# Patient Record
Sex: FEMALE | Race: WHITE | Employment: FULL TIME | URBAN - METROPOLITAN AREA
[De-identification: names, ages, dates, MRNs, and addresses within clinical notes are randomized per-mention and may not be internally consistent; named-entity substitution may affect disease eponyms.]

---

## 2018-02-02 ENCOUNTER — HOSPITAL ENCOUNTER (OUTPATIENT)
Dept: GENERAL RADIOLOGY | Age: 48
Discharge: HOME | End: 2018-02-02
Attending: FAMILY MEDICINE
Payer: MEDICAID

## 2018-02-02 ENCOUNTER — OFFICE VISIT (OUTPATIENT)
Dept: FAMILY MEDICINE CLINIC | Facility: CLINIC | Age: 48
End: 2018-02-02

## 2018-02-02 ENCOUNTER — LAB ENCOUNTER (OUTPATIENT)
Dept: LAB | Age: 48
End: 2018-02-02
Attending: FAMILY MEDICINE
Payer: MEDICAID

## 2018-02-02 VITALS
HEART RATE: 65 BPM | WEIGHT: 172 LBS | HEIGHT: 62.5 IN | TEMPERATURE: 98 F | SYSTOLIC BLOOD PRESSURE: 114 MMHG | DIASTOLIC BLOOD PRESSURE: 74 MMHG | BODY MASS INDEX: 30.86 KG/M2

## 2018-02-02 DIAGNOSIS — M54.50 CHRONIC MIDLINE LOW BACK PAIN WITHOUT SCIATICA: ICD-10-CM

## 2018-02-02 DIAGNOSIS — Z23 NEED FOR VACCINATION: ICD-10-CM

## 2018-02-02 DIAGNOSIS — Z12.31 VISIT FOR SCREENING MAMMOGRAM: ICD-10-CM

## 2018-02-02 DIAGNOSIS — G89.29 CHRONIC MIDLINE LOW BACK PAIN WITHOUT SCIATICA: ICD-10-CM

## 2018-02-02 DIAGNOSIS — E28.2 PCOS (POLYCYSTIC OVARIAN SYNDROME): ICD-10-CM

## 2018-02-02 DIAGNOSIS — E11.65 UNCONTROLLED TYPE 2 DIABETES MELLITUS WITH HYPERGLYCEMIA, WITHOUT LONG-TERM CURRENT USE OF INSULIN (HCC): ICD-10-CM

## 2018-02-02 DIAGNOSIS — Z12.4 CERVICAL CANCER SCREENING: ICD-10-CM

## 2018-02-02 DIAGNOSIS — Z00.00 ADULT GENERAL MEDICAL EXAM: Primary | ICD-10-CM

## 2018-02-02 DIAGNOSIS — Z00.00 ADULT GENERAL MEDICAL EXAM: ICD-10-CM

## 2018-02-02 DIAGNOSIS — L68.0 HIRSUTISM: ICD-10-CM

## 2018-02-02 LAB
ALBUMIN SERPL BCP-MCNC: 4.5 G/DL (ref 3.5–4.8)
ALBUMIN/GLOB SERPL: 1.2 {RATIO} (ref 1–2)
ALP SERPL-CCNC: 91 U/L (ref 32–100)
ALT SERPL-CCNC: 12 U/L (ref 14–54)
ANION GAP SERPL CALC-SCNC: 9 MMOL/L (ref 0–18)
AST SERPL-CCNC: 18 U/L (ref 15–41)
BASOPHILS # BLD: 0 K/UL (ref 0–0.2)
BASOPHILS NFR BLD: 1 %
BILIRUB SERPL-MCNC: 0.5 MG/DL (ref 0.3–1.2)
BUN SERPL-MCNC: 14 MG/DL (ref 8–20)
BUN/CREAT SERPL: 23 (ref 10–20)
CALCIUM SERPL-MCNC: 9.7 MG/DL (ref 8.5–10.5)
CHLORIDE SERPL-SCNC: 102 MMOL/L (ref 95–110)
CHOLEST SERPL-MCNC: 264 MG/DL (ref 110–200)
CO2 SERPL-SCNC: 27 MMOL/L (ref 22–32)
CREAT SERPL-MCNC: 0.61 MG/DL (ref 0.5–1.5)
CREAT UR-MCNC: 216.4 MG/DL
EOSINOPHIL # BLD: 0.1 K/UL (ref 0–0.7)
EOSINOPHIL NFR BLD: 2 %
ERYTHROCYTE [DISTWIDTH] IN BLOOD BY AUTOMATED COUNT: 13 % (ref 11–15)
GLOBULIN PLAS-MCNC: 3.7 G/DL (ref 2.5–3.7)
GLUCOSE SERPL-MCNC: 261 MG/DL (ref 70–99)
HCT VFR BLD AUTO: 47.2 % (ref 35–48)
HDLC SERPL-MCNC: 49 MG/DL
HGB BLD-MCNC: 16 G/DL (ref 12–16)
LDLC SERPL CALC-MCNC: 167 MG/DL (ref 0–99)
LYMPHOCYTES # BLD: 2.2 K/UL (ref 1–4)
LYMPHOCYTES NFR BLD: 41 %
MCH RBC QN AUTO: 30.1 PG (ref 27–32)
MCHC RBC AUTO-ENTMCNC: 33.9 G/DL (ref 32–37)
MCV RBC AUTO: 88.8 FL (ref 80–100)
MICROALBUMIN UR-MCNC: 2.4 MG/DL (ref 0–1.8)
MICROALBUMIN/CREAT UR: 11.1 MG/G{CREAT} (ref 0–20)
MONOCYTES # BLD: 0.3 K/UL (ref 0–1)
MONOCYTES NFR BLD: 6 %
NEUTROPHILS # BLD AUTO: 2.8 K/UL (ref 1.8–7.7)
NEUTROPHILS NFR BLD: 51 %
NONHDLC SERPL-MCNC: 215 MG/DL
OSMOLALITY UR CALC.SUM OF ELEC: 296 MOSM/KG (ref 275–295)
PLATELET # BLD AUTO: 209 K/UL (ref 140–400)
PMV BLD AUTO: 8.6 FL (ref 7.4–10.3)
POTASSIUM SERPL-SCNC: 3.9 MMOL/L (ref 3.3–5.1)
PROT SERPL-MCNC: 8.2 G/DL (ref 5.9–8.4)
RBC # BLD AUTO: 5.32 M/UL (ref 3.7–5.4)
SODIUM SERPL-SCNC: 138 MMOL/L (ref 136–144)
TRIGL SERPL-MCNC: 242 MG/DL (ref 1–149)
TSH SERPL-ACNC: 2.08 UIU/ML (ref 0.45–5.33)
WBC # BLD AUTO: 5.5 K/UL (ref 4–11)

## 2018-02-02 PROCEDURE — 82570 ASSAY OF URINE CREATININE: CPT

## 2018-02-02 PROCEDURE — 83036 HEMOGLOBIN GLYCOSYLATED A1C: CPT

## 2018-02-02 PROCEDURE — 72110 X-RAY EXAM L-2 SPINE 4/>VWS: CPT | Performed by: FAMILY MEDICINE

## 2018-02-02 PROCEDURE — 82043 UR ALBUMIN QUANTITATIVE: CPT

## 2018-02-02 PROCEDURE — 84443 ASSAY THYROID STIM HORMONE: CPT

## 2018-02-02 PROCEDURE — 80053 COMPREHEN METABOLIC PANEL: CPT

## 2018-02-02 PROCEDURE — 90471 IMMUNIZATION ADMIN: CPT | Performed by: FAMILY MEDICINE

## 2018-02-02 PROCEDURE — 90686 IIV4 VACC NO PRSV 0.5 ML IM: CPT | Performed by: FAMILY MEDICINE

## 2018-02-02 PROCEDURE — 85025 COMPLETE CBC W/AUTO DIFF WBC: CPT

## 2018-02-02 PROCEDURE — 99386 PREV VISIT NEW AGE 40-64: CPT | Performed by: FAMILY MEDICINE

## 2018-02-02 PROCEDURE — 36415 COLL VENOUS BLD VENIPUNCTURE: CPT

## 2018-02-02 PROCEDURE — 80061 LIPID PANEL: CPT

## 2018-02-02 PROCEDURE — 99214 OFFICE O/P EST MOD 30 MIN: CPT | Performed by: FAMILY MEDICINE

## 2018-02-02 NOTE — PROGRESS NOTES
Patient ID: Carly Warren is a 56year old female. HPI  Patient presents with:  Physical    She is to be on metformin twice daily but due to insurance reasons she had no insurance and had to stop 7 months ago.   She states she started taking natural bruise/bleed easily. Psychiatric/Behavioral: Negative for agitation, behavioral problems, dysphoric mood and hallucinations. The patient is not nervous/anxious.           Past Medical History:   Diagnosis Date   • Diabetes ([facility] [address]    • Hyperlipidemia reflexes were 2 out of 4 bilateral lower extremity, sensory exam was intact, good strength with plantar flexion and dorsiflexion, gait was normal.  Able to flex forward at the waist and touch the ankles. No bony tenderness.     Straight leg raise is negati

## 2018-02-03 LAB — HBA1C MFR BLD: 11.7 % (ref 4–6)

## 2018-02-04 ENCOUNTER — TELEPHONE (OUTPATIENT)
Dept: FAMILY MEDICINE CLINIC | Facility: CLINIC | Age: 48
End: 2018-02-04

## 2018-02-04 NOTE — TELEPHONE ENCOUNTER
anemia.  Your thyroid test is normal.  Your liver function is normal.  Your diabetes is clearly uncontrolled.  We want a hemoglobin A1c to be 6.5 and you are at 11.7.  This means your sugar is probably running in the high 290 still at the 300 range when yo

## 2018-02-05 NOTE — TELEPHONE ENCOUNTER
Sent a glucometer to her pharmacy. She should check once in the morning fasting and then before bed.

## 2018-02-06 ENCOUNTER — TELEPHONE (OUTPATIENT)
Dept: OTHER | Age: 48
End: 2018-02-06

## 2018-02-06 NOTE — TELEPHONE ENCOUNTER
Patient informed of results, and given MD's recommendations. Patient verbalized understanding with intent to comply. Questions answered. At this time declines PT. Patient will do exercises at home and monitor DM.       Will mail report of labs and xra

## 2018-02-06 NOTE — TELEPHONE ENCOUNTER
LMTCB, please transfer to triage RN                Notes Recorded by Jade Triana DO on 2/3/2018 at 12:18 PM CST  You have some age-related arthritic changes of your low back along with some disc space narrowing.  Weight loss of course would be helpful

## 2018-02-16 ENCOUNTER — OFFICE VISIT (OUTPATIENT)
Dept: DERMATOLOGY CLINIC | Facility: CLINIC | Age: 48
End: 2018-02-16

## 2018-02-16 DIAGNOSIS — L02.92 BOIL: Primary | ICD-10-CM

## 2018-02-16 DIAGNOSIS — Z87.42 HISTORY OF PCOS: ICD-10-CM

## 2018-02-16 DIAGNOSIS — L68.0 HIRSUTISM: ICD-10-CM

## 2018-02-16 PROCEDURE — 99202 OFFICE O/P NEW SF 15 MIN: CPT | Performed by: DERMATOLOGY

## 2018-02-16 PROCEDURE — 99212 OFFICE O/P EST SF 10 MIN: CPT | Performed by: DERMATOLOGY

## 2018-02-16 RX ORDER — SULFAMETHOXAZOLE AND TRIMETHOPRIM 800; 160 MG/1; MG/1
1 TABLET ORAL 2 TIMES DAILY
Qty: 40 TABLET | Refills: 0 | Status: SHIPPED | OUTPATIENT
Start: 2018-02-16 | End: 2018-04-17

## 2018-02-16 NOTE — PATIENT INSTRUCTIONS
B. vitamin supplementation--b complex vitamin   biotin to 10 mg daily,   vitamin D3 2000 units daily,   adequate protein intake (40 to 80 g daily)  no Smoking  use of volumizing shampoos and antidandruff shampoos as appropriate.    Use of minoxidil 5% foam

## 2018-02-25 NOTE — PROGRESS NOTES
Jordan Dillon is a 56year old female.     Patient presents with:  Derm Problem: NEW PT sent in by her PCP for Hirsutism not currently being treated, pt also thinks she has an abscess on her left chin that started Tuesday as a pimple that got swoll Gluc Meter Disp-Strips Does not apply Device Needs 1 glucometer along with 100 test strips and 100 lancets with 11 refills each. Check sugars BID and prn.  Disp: 1 Device Rfl: 0   MetFORMIN HCl 1000 MG Oral Tab Take 1 tablet (1,000 mg total) by mouth 2 (two Not brought over this. PCO S diagnosed in 1996. Had been on spironolactone metformin currently on metformin thousand milligrams twice daily has been on this for several years. No change in hirsutism. Some slow decrease in hair density on the scalp. regarding benign skin lesions. Signs and symptoms of skin cancer, ABCDE's of melanoma briefly reviewed. Sunscreen use, sun protection, encouraged. Followup as noted in rtc or p.r.n. No orders of the defined types were placed in this encounter.       Med

## 2018-03-08 ENCOUNTER — APPOINTMENT (OUTPATIENT)
Dept: ENDOCRINOLOGY | Facility: HOSPITAL | Age: 48
End: 2018-03-08
Attending: FAMILY MEDICINE
Payer: MEDICAID

## 2020-03-30 ENCOUNTER — NURSE TRIAGE (OUTPATIENT)
Dept: OTHER | Age: 50
End: 2020-03-30

## 2020-03-30 NOTE — TELEPHONE ENCOUNTER
She saw me once in 2018. She has terrible uncontrolled diabetes and never came back. Her hemoglobin A1c was above 11. I agree with your triage plan.   This does not sound like COVID-19 considering that she really has no fever and her biggest complaint is

## 2020-03-30 NOTE — TELEPHONE ENCOUNTER
Spoke with the patient and instructed her on Dr. Tomás Garcia message and plan of care from below. Patient voiced understanding and agreed with the plan of care.

## 2020-03-30 NOTE — TELEPHONE ENCOUNTER
Action Requested: Summary for Provider     []  Critical Lab, Recommendations Needed  [x] Need Additional Advice  []   FYI    []   Need Orders  [] Need Medications Sent to Pharmacy  []  Other     SUMMARY: pt states she woke up with a sore throat this sharla

## 2021-04-09 ENCOUNTER — IMMUNIZATION (OUTPATIENT)
Dept: LAB | Facility: HOSPITAL | Age: 51
End: 2021-04-09
Attending: HOSPITALIST
Payer: COMMERCIAL

## 2021-04-09 DIAGNOSIS — Z23 NEED FOR VACCINATION: Primary | ICD-10-CM

## 2021-04-09 PROCEDURE — 0011A SARSCOV2 VAC 100MCG/0.5ML IM: CPT

## 2021-05-11 ENCOUNTER — IMMUNIZATION (OUTPATIENT)
Dept: LAB | Facility: HOSPITAL | Age: 51
End: 2021-05-11
Attending: EMERGENCY MEDICINE
Payer: COMMERCIAL

## 2021-05-11 DIAGNOSIS — Z23 NEED FOR VACCINATION: Primary | ICD-10-CM

## 2021-05-11 PROCEDURE — 0012A SARSCOV2 VAC 100MCG/0.5ML IM: CPT

## 2021-10-06 RX ORDER — BLOOD-GLUCOSE METER
1 EACH MISCELLANEOUS 2 TIMES DAILY
Qty: 1 KIT | Refills: 0 | Status: SHIPPED | OUTPATIENT
Start: 2021-10-06

## 2021-10-06 RX ORDER — BLOOD SUGAR DIAGNOSTIC
1 STRIP MISCELLANEOUS 2 TIMES DAILY
Qty: 200 EACH | Refills: 3 | Status: SHIPPED | OUTPATIENT
Start: 2021-10-06

## 2021-10-06 RX ORDER — LANCETS 30 GAUGE
1 EACH MISCELLANEOUS 2 TIMES DAILY
Qty: 200 EACH | Refills: 3 | Status: SHIPPED | OUTPATIENT
Start: 2021-10-06

## 2021-10-06 NOTE — TELEPHONE ENCOUNTER
Per patient , states that she is checking her blood sugar at least twice a day. Last office visits was 2/2/2018, with upcoming appointment on 10/11/21.        Dr Burnette=see above, pended for approval.

## 2021-10-06 NOTE — TELEPHONE ENCOUNTER
Pt would like an order for a new meter, test strips, and lancets. Pt said hers no longer works.  Please advise

## 2021-10-11 ENCOUNTER — LAB ENCOUNTER (OUTPATIENT)
Dept: LAB | Age: 51
End: 2021-10-11
Attending: INTERNAL MEDICINE
Payer: COMMERCIAL

## 2021-10-11 ENCOUNTER — OFFICE VISIT (OUTPATIENT)
Dept: FAMILY MEDICINE CLINIC | Facility: CLINIC | Age: 51
End: 2021-10-11
Payer: COMMERCIAL

## 2021-10-11 VITALS
HEIGHT: 62 IN | HEART RATE: 79 BPM | SYSTOLIC BLOOD PRESSURE: 89 MMHG | WEIGHT: 163 LBS | DIASTOLIC BLOOD PRESSURE: 58 MMHG | BODY MASS INDEX: 30 KG/M2 | TEMPERATURE: 98 F

## 2021-10-11 DIAGNOSIS — G89.29 CHRONIC PAIN OF BOTH KNEES: ICD-10-CM

## 2021-10-11 DIAGNOSIS — Z12.11 SCREENING FOR COLON CANCER: ICD-10-CM

## 2021-10-11 DIAGNOSIS — E11.65 UNCONTROLLED DIABETES MELLITUS WITH HYPERGLYCEMIA, WITH LONG-TERM CURRENT USE OF INSULIN (HCC): Primary | ICD-10-CM

## 2021-10-11 DIAGNOSIS — B35.1 ONYCHOMYCOSIS OF TOENAIL: ICD-10-CM

## 2021-10-11 DIAGNOSIS — E11.65 UNCONTROLLED DIABETES MELLITUS WITH HYPERGLYCEMIA, WITH LONG-TERM CURRENT USE OF INSULIN (HCC): ICD-10-CM

## 2021-10-11 DIAGNOSIS — M25.562 CHRONIC PAIN OF BOTH KNEES: ICD-10-CM

## 2021-10-11 DIAGNOSIS — M25.561 CHRONIC PAIN OF BOTH KNEES: ICD-10-CM

## 2021-10-11 DIAGNOSIS — E55.9 VITAMIN D DEFICIENCY: ICD-10-CM

## 2021-10-11 DIAGNOSIS — E11.42 DIABETIC POLYNEUROPATHY ASSOCIATED WITH TYPE 2 DIABETES MELLITUS (HCC): ICD-10-CM

## 2021-10-11 DIAGNOSIS — Z12.31 VISIT FOR SCREENING MAMMOGRAM: ICD-10-CM

## 2021-10-11 DIAGNOSIS — G89.29 CHRONIC RIGHT SHOULDER PAIN: ICD-10-CM

## 2021-10-11 DIAGNOSIS — M25.511 CHRONIC RIGHT SHOULDER PAIN: ICD-10-CM

## 2021-10-11 DIAGNOSIS — Z12.4 CERVICAL CANCER SCREENING: ICD-10-CM

## 2021-10-11 DIAGNOSIS — Z23 NEED FOR VACCINATION: ICD-10-CM

## 2021-10-11 DIAGNOSIS — Z79.4 UNCONTROLLED DIABETES MELLITUS WITH HYPERGLYCEMIA, WITH LONG-TERM CURRENT USE OF INSULIN (HCC): Primary | ICD-10-CM

## 2021-10-11 DIAGNOSIS — Z79.4 UNCONTROLLED DIABETES MELLITUS WITH HYPERGLYCEMIA, WITH LONG-TERM CURRENT USE OF INSULIN (HCC): ICD-10-CM

## 2021-10-11 PROCEDURE — 82306 VITAMIN D 25 HYDROXY: CPT

## 2021-10-11 PROCEDURE — 90686 IIV4 VACC NO PRSV 0.5 ML IM: CPT | Performed by: FAMILY MEDICINE

## 2021-10-11 PROCEDURE — 90472 IMMUNIZATION ADMIN EACH ADD: CPT | Performed by: FAMILY MEDICINE

## 2021-10-11 PROCEDURE — 85025 COMPLETE CBC W/AUTO DIFF WBC: CPT

## 2021-10-11 PROCEDURE — 80061 LIPID PANEL: CPT

## 2021-10-11 PROCEDURE — 80053 COMPREHEN METABOLIC PANEL: CPT

## 2021-10-11 PROCEDURE — 3078F DIAST BP <80 MM HG: CPT | Performed by: FAMILY MEDICINE

## 2021-10-11 PROCEDURE — 90471 IMMUNIZATION ADMIN: CPT | Performed by: FAMILY MEDICINE

## 2021-10-11 PROCEDURE — 3074F SYST BP LT 130 MM HG: CPT | Performed by: FAMILY MEDICINE

## 2021-10-11 PROCEDURE — 90732 PPSV23 VACC 2 YRS+ SUBQ/IM: CPT | Performed by: FAMILY MEDICINE

## 2021-10-11 PROCEDURE — 82570 ASSAY OF URINE CREATININE: CPT

## 2021-10-11 PROCEDURE — 36415 COLL VENOUS BLD VENIPUNCTURE: CPT

## 2021-10-11 PROCEDURE — 83036 HEMOGLOBIN GLYCOSYLATED A1C: CPT

## 2021-10-11 PROCEDURE — 84443 ASSAY THYROID STIM HORMONE: CPT

## 2021-10-11 PROCEDURE — 99205 OFFICE O/P NEW HI 60 MIN: CPT | Performed by: FAMILY MEDICINE

## 2021-10-11 PROCEDURE — 84439 ASSAY OF FREE THYROXINE: CPT

## 2021-10-11 PROCEDURE — 90715 TDAP VACCINE 7 YRS/> IM: CPT | Performed by: FAMILY MEDICINE

## 2021-10-11 PROCEDURE — 3008F BODY MASS INDEX DOCD: CPT | Performed by: FAMILY MEDICINE

## 2021-10-11 PROCEDURE — 82043 UR ALBUMIN QUANTITATIVE: CPT

## 2021-10-11 RX ORDER — METFORMIN HYDROCHLORIDE 500 MG/1
500 TABLET, EXTENDED RELEASE ORAL
COMMUNITY
Start: 2020-05-28 | End: 2021-10-19

## 2021-10-11 NOTE — PROGRESS NOTES
Patient ID: Kayla Browne is a 49year old female. HPI  Patient presents with:  Diabetes    Last seen by me on 2/2/2018. I am not seen her for quite some time.   She is here for diabetes but then had numerous other complaints that she wanted to unsure of the date of her last tetanus vaccine. I spoke to her about the vaccines that she needs because she is diabetic. Pt will receive the tetanus, pneumonia and flu vaccines today.     Wt Readings from Last 6 Encounters:  10/11/21 : 163 lb  02/02/18 : Cardiovascular: Normal rate, regular rhythm and normal heart sounds. Pulmonary/Chest: Effort normal and breath sounds normal. No respiratory distress. Lymphadenopathy: Patient has no cervical adenopathy.   Neurological: Patient is alert and oriented which is the worse shoulder. Diabetic polyneuropathy associated with type 2 diabetes mellitus ([facility] [address]  We will await labs.       Referrals (if applicable)  Orders Placed This Encounter      [facility] - INTERNAL          Order Comments:              ALSO WORKS AT A

## 2021-10-11 NOTE — PATIENT INSTRUCTIONS
Call your ophthalmologist and have them fax over your progress note to [phone], attention Dr. Leonila Shah.

## 2021-10-18 ENCOUNTER — HOSPITAL ENCOUNTER (OUTPATIENT)
Dept: GENERAL RADIOLOGY | Age: 51
Discharge: HOME | End: 2021-10-18
Attending: FAMILY MEDICINE
Payer: COMMERCIAL

## 2021-10-18 DIAGNOSIS — G89.29 CHRONIC PAIN OF BOTH KNEES: ICD-10-CM

## 2021-10-18 DIAGNOSIS — M25.511 CHRONIC RIGHT SHOULDER PAIN: ICD-10-CM

## 2021-10-18 DIAGNOSIS — G89.29 CHRONIC RIGHT SHOULDER PAIN: ICD-10-CM

## 2021-10-18 DIAGNOSIS — M25.561 CHRONIC PAIN OF BOTH KNEES: ICD-10-CM

## 2021-10-18 DIAGNOSIS — M25.562 CHRONIC PAIN OF BOTH KNEES: ICD-10-CM

## 2021-10-18 PROCEDURE — 73562 X-RAY EXAM OF KNEE 3: CPT | Performed by: FAMILY MEDICINE

## 2021-10-18 PROCEDURE — 73030 X-RAY EXAM OF SHOULDER: CPT | Performed by: FAMILY MEDICINE

## 2021-10-19 ENCOUNTER — TELEMEDICINE (OUTPATIENT)
Dept: FAMILY MEDICINE CLINIC | Facility: CLINIC | Age: 51
End: 2021-10-19

## 2021-10-19 ENCOUNTER — TELEPHONE (OUTPATIENT)
Dept: FAMILY MEDICINE CLINIC | Facility: CLINIC | Age: 51
End: 2021-10-19

## 2021-10-19 DIAGNOSIS — M25.561 BILATERAL CHRONIC KNEE PAIN: ICD-10-CM

## 2021-10-19 DIAGNOSIS — G89.29 BILATERAL CHRONIC KNEE PAIN: ICD-10-CM

## 2021-10-19 DIAGNOSIS — R79.89 ELEVATED TSH: ICD-10-CM

## 2021-10-19 DIAGNOSIS — R63.4 UNINTENTIONAL WEIGHT LOSS: ICD-10-CM

## 2021-10-19 DIAGNOSIS — E11.65 UNCONTROLLED TYPE 2 DIABETES MELLITUS WITH HYPERGLYCEMIA, WITHOUT LONG-TERM CURRENT USE OF INSULIN (HCC): Primary | ICD-10-CM

## 2021-10-19 DIAGNOSIS — E55.9 VITAMIN D DEFICIENCY: ICD-10-CM

## 2021-10-19 DIAGNOSIS — M25.562 BILATERAL CHRONIC KNEE PAIN: ICD-10-CM

## 2021-10-19 DIAGNOSIS — R42 DIZZINESS: ICD-10-CM

## 2021-10-19 DIAGNOSIS — E78.2 MIXED HYPERLIPIDEMIA: ICD-10-CM

## 2021-10-19 PROCEDURE — 99215 OFFICE O/P EST HI 40 MIN: CPT | Performed by: FAMILY MEDICINE

## 2021-10-19 RX ORDER — ERGOCALCIFEROL 1.25 MG/1
50000 CAPSULE ORAL WEEKLY
Qty: 4 CAPSULE | Refills: 0 | Status: SHIPPED | OUTPATIENT
Start: 2021-10-19 | End: 2021-10-19

## 2021-10-19 RX ORDER — GLIMEPIRIDE 4 MG/1
4 TABLET ORAL
Qty: 90 TABLET | Refills: 1 | Status: SHIPPED | OUTPATIENT
Start: 2021-10-19

## 2021-10-19 RX ORDER — ATORVASTATIN CALCIUM 20 MG/1
20 TABLET, FILM COATED ORAL NIGHTLY
Qty: 90 TABLET | Refills: 1 | Status: SHIPPED | OUTPATIENT
Start: 2021-10-19 | End: 2022-02-16

## 2021-10-19 RX ORDER — ERGOCALCIFEROL 1.25 MG/1
50000 CAPSULE ORAL WEEKLY
Qty: 12 CAPSULE | Refills: 1 | Status: SHIPPED | OUTPATIENT
Start: 2021-10-19 | End: 2022-01-17

## 2021-10-19 RX ORDER — METFORMIN HYDROCHLORIDE 500 MG/1
1000 TABLET, EXTENDED RELEASE ORAL 2 TIMES DAILY WITH MEALS
Qty: 360 TABLET | Refills: 1 | Status: SHIPPED | OUTPATIENT
Start: 2021-10-19 | End: 2022-01-17

## 2021-10-19 NOTE — PROGRESS NOTES
VIDEO VISIT PROGRESS NOTE (Non-Covid-19)  Todays date: 10/19/2021 9:17 AM    Due to the COVID-19 emergency implementation plan, this patient's visit was converted to a video visit as agreed upon with the patient.     Mg [address] Be states it feels like her bone is touching the floor and there is no cushion. Pt had a right shoulder XR and left and right knee XRs on 10/18/2021; I reviewed the results. She brought this up at our visit the last appointment.   She brought up numerous issu Delica Lancets 00Q Does not apply Misc 1 each by Does not apply route 2 (two) times a day. 200 each 3   • Blood Glucose Monitoring Suppl (ONETOUCH ULTRA 2) w/Device Does not apply Kit 1 kit by Other route 2 (two) times a day.  1 kit 0   • Blood Gluc Meter D atorvastatin (LIPITOR) 20 MG Oral Tab; Take 1 tablet (20 mg total) by mouth nightly. For cholesterol.  -     ALT (SGPT); Future  -     AST (SGOT); Future  -     LIPID PANEL;  Future  Discussed all labs at length and we will start her on Lipitor as well and refills each. Check sugars BID and prn. 1 Device 0            Marleny Patel Records advised to follow CDC guidelines for self isolation and symptomatic treatment as outlined on CDC Patient Guidelines.  Jose Angel Pepe understands video evaluation is not

## 2022-09-28 ENCOUNTER — TELEPHONE (OUTPATIENT)
Dept: FAMILY MEDICINE CLINIC | Facility: CLINIC | Age: 52
End: 2022-09-28

## 2022-09-28 NOTE — TELEPHONE ENCOUNTER
Contacted patient to inform she is overdue for a diabetic follow up appointment. I offered to schedule appointment but patient refused, states she will check her new insurance and call back to schedule appointment if insurance is still in network.

## 2024-03-21 ENCOUNTER — TELEPHONE (OUTPATIENT)
Dept: FAMILY MEDICINE CLINIC | Facility: CLINIC | Age: 54
End: 2024-03-21

## 2024-03-25 ENCOUNTER — PATIENT OUTREACH (OUTPATIENT)
Dept: CASE MANAGEMENT | Age: 54
End: 2024-03-25

## 2024-03-25 NOTE — PROCEDURES
The office order for PCP removal request is Approved and finalized on March 25, 2024.    Thanks,  [facility] Team

## 2024-05-20 ENCOUNTER — OFFICE VISIT (OUTPATIENT)
Dept: INTERNAL MEDICINE CLINIC | Facility: CLINIC | Age: 54
End: 2024-05-20

## 2024-05-20 VITALS
OXYGEN SATURATION: 96 % | BODY MASS INDEX: 30 KG/M2 | DIASTOLIC BLOOD PRESSURE: 78 MMHG | TEMPERATURE: 98 F | HEART RATE: 81 BPM | WEIGHT: 163 LBS | SYSTOLIC BLOOD PRESSURE: 125 MMHG | HEIGHT: 62 IN

## 2024-05-20 DIAGNOSIS — T14.8XXA OPEN WOUND: Primary | ICD-10-CM

## 2024-05-20 PROCEDURE — 99203 OFFICE O/P NEW LOW 30 MIN: CPT | Performed by: INTERNAL MEDICINE

## 2024-05-20 NOTE — PROGRESS NOTES
Subjective:   Patient ID: Marleny Lorenz is a 54 year old female.    Wound Care        History/Other: She came in today because of  Wound on house her left ankle according to the patient she is diabetic she is not taking medication has not been checked for few years because she does not have insurance  It started rearrest scratching her ankle while she was cleaning her heels.  According to her initially was a small scratch which got worse.  She went to [place] for trip he started to get worse she started taking antibiotics from there she came back on the 10th.  Currently she is taking antibiotic.  Review of Systems   Constitutional: Negative.    HENT: Negative.     Eyes: Negative.    Respiratory: Negative.     Cardiovascular: Negative.    Endocrine: Negative.    Genitourinary: Negative.    Musculoskeletal:         Left ankle wound   Allergic/Immunologic: Negative.    Neurological: Negative.    Hematological: Negative.    Psychiatric/Behavioral: Negative.       Current Outpatient Medications   Medication Sig Dispense Refill    metFORMIN HCl 1000 MG Oral Tab Take 1 tablet (1,000 mg total) by mouth 2 (two) times daily with meals.      glimepiride 4 MG Oral Tab Take 1 tablet (4 mg total) by mouth every morning before breakfast. For diabetes (Patient not taking: Reported on 5/20/2024) 90 tablet 1    Glucose Blood (ONETOUCH ULTRA) In Vitro Strip 1 each by Other route 2 (two) times a day. (Patient not taking: Reported on 5/20/2024) 200 each 3    OneTouch Delica Lancets 30G Does not apply Misc 1 each by Does not apply route 2 (two) times a day. (Patient not taking: Reported on 5/20/2024) 200 each 3    Blood Glucose Monitoring Suppl (ONETOUCH ULTRA 2) w/Device Does not apply Kit 1 kit by Other route 2 (two) times a day. (Patient not taking: Reported on 5/20/2024) 1 kit 0    Blood Gluc Meter Disp-Strips Does not apply Device Needs 1 glucometer along with 100 test strips and 100 lancets with 11 refills each. Check sugars  BID and prn. (Patient not taking: Reported on 5/20/2024) 1 Device 0     Allergies:No Known Allergies    Objective:   Physical Exam  Vitals and nursing note reviewed.   Constitutional:       Appearance: Normal appearance.   HENT:      Head: Normocephalic and atraumatic.   Cardiovascular:      Rate and Rhythm: Normal rate and regular rhythm.      Pulses: Normal pulses.      Heart sounds: Normal heart sounds.   Pulmonary:      Effort: Pulmonary effort is normal.      Breath sounds: Normal breath sounds.   Abdominal:      Palpations: Abdomen is soft.   Musculoskeletal:      Cervical back: Normal range of motion and neck supple.   Skin:     General: Skin is warm.      Comments: Wound medial aspect of her left ankle, crusted , redness around   Neurological:      Mental Status: She is alert. Mental status is at baseline.         Assessment & Plan:   1. Open wound    Left ankle she is diabetic, I did advise her to stop antibiotic after today I referred her to see, podiatry we need to rule out osteomyelitis    No orders of the defined types were placed in this encounter.      Meds This Visit:  Requested Prescriptions      No prescriptions requested or ordered in this encounter       Imaging & Referrals:  PODIATRY - INTERNAL

## 2024-06-14 ENCOUNTER — OFFICE VISIT (OUTPATIENT)
Dept: INTERNAL MEDICINE CLINIC | Facility: CLINIC | Age: 54
End: 2024-06-14

## 2024-06-14 VITALS
DIASTOLIC BLOOD PRESSURE: 74 MMHG | WEIGHT: 160 LBS | TEMPERATURE: 98 F | HEIGHT: 62 IN | BODY MASS INDEX: 29.44 KG/M2 | HEART RATE: 83 BPM | SYSTOLIC BLOOD PRESSURE: 118 MMHG | OXYGEN SATURATION: 97 %

## 2024-06-14 DIAGNOSIS — R42 DIZZINESS: ICD-10-CM

## 2024-06-14 DIAGNOSIS — E11.65 UNCONTROLLED TYPE 2 DIABETES MELLITUS WITH HYPERGLYCEMIA, WITHOUT LONG-TERM CURRENT USE OF INSULIN (HCC): Primary | ICD-10-CM

## 2024-06-14 DIAGNOSIS — Z12.11 SCREENING FOR MALIGNANT NEOPLASM OF COLON: ICD-10-CM

## 2024-06-14 DIAGNOSIS — Z12.4 SCREENING FOR MALIGNANT NEOPLASM OF CERVIX: ICD-10-CM

## 2024-06-14 DIAGNOSIS — G89.29 CHRONIC KNEE PAIN, UNSPECIFIED LATERALITY: ICD-10-CM

## 2024-06-14 DIAGNOSIS — M25.569 CHRONIC KNEE PAIN, UNSPECIFIED LATERALITY: ICD-10-CM

## 2024-06-14 DIAGNOSIS — Z12.31 ENCOUNTER FOR SCREENING MAMMOGRAM FOR MALIGNANT NEOPLASM OF BREAST: ICD-10-CM

## 2024-06-14 PROCEDURE — 99215 OFFICE O/P EST HI 40 MIN: CPT | Performed by: INTERNAL MEDICINE

## 2024-06-14 PROCEDURE — 90677 PCV20 VACCINE IM: CPT | Performed by: INTERNAL MEDICINE

## 2024-06-14 PROCEDURE — 90471 IMMUNIZATION ADMIN: CPT | Performed by: INTERNAL MEDICINE

## 2024-06-14 RX ORDER — SEMAGLUTIDE 0.68 MG/ML
0.25 INJECTION, SOLUTION SUBCUTANEOUS WEEKLY
Qty: 1 EACH | Refills: 12 | Status: SHIPPED | OUTPATIENT
Start: 2024-06-14

## 2024-06-14 RX ORDER — ATORVASTATIN CALCIUM 10 MG/1
10 TABLET, FILM COATED ORAL NIGHTLY
Qty: 90 TABLET | Refills: 3 | Status: SHIPPED | OUTPATIENT
Start: 2024-06-14

## 2024-06-14 NOTE — PROGRESS NOTES
Marleny Lorenz is a 54 year old female.  Chief Complaint   Patient presents with    [facility]    Physical     HPI:   Marleny Lorenz is a 54 year old female who presents to [facility].    Previous PCP was Dr. Burnette. Last seen 10/19/21.    States sugar levels have recently been \"really wacky\", ~300 3-4 weeks ago. Hadn't been taking medication or checking her blood sugars in \"awhile\".    Developed a wound on L ankle and seen by podiatry, Dr. Rao. Since, she has started taking diabetic medication again, changed eating habits, and sugars have come down a little bit 118-180s pre-prandial.    Of note, had labs drawn at [facility] 6/1/24: cbc wnl, K 4.3 BUN/Cr 18/0.73, glucose 239, hemoglobin A1c 11.4, esr 2 crp 1.    Plans to schedule an appointment to see a cardiologist soon at recommendation of her podiatrist.     Wt Readings from Last 6 Encounters:   06/14/24 160 lb (72.6 kg)   05/20/24 163 lb (73.9 kg)   10/11/21 163 lb (73.9 kg)   02/02/18 172 lb (78 kg)     Body mass index is 29.26 kg/m².     Current Outpatient Medications   Medication Sig Dispense Refill    NON FORMULARY Take 4 capsules by mouth daily. 4 Life - glucose      COLLAGEN OR Take 2 capsules by mouth daily.      atorvastatin 10 MG Oral Tab Take 1 tablet (10 mg total) by mouth nightly. 90 tablet 3    metFORMIN HCl 1000 MG Oral Tab Take 1 tablet (1,000 mg total) by mouth 2 (two) times daily with meals.      Glucose Blood (ONETOUCH ULTRA) In Vitro Strip 1 each by Other route 2 (two) times a day. 200 each 3    OneTouch Delica Lancets 30G Does not apply Misc 1 each by Does not apply route 2 (two) times a day. 200 each 3    Blood Glucose Monitoring Suppl (ONETOUCH ULTRA 2) w/Device Does not apply Kit 1 kit by Other route 2 (two) times a day. 1 kit 0    glimepiride 4 MG Oral Tab Take 1 tablet (4 mg total) by mouth every morning before breakfast. For diabetes (Patient not taking: Reported on 5/20/2024) 90 tablet 1      Past Medical  History:    Diabetes (HCC)    Hyperlipidemia    PCOS (polycystic ovarian syndrome)      History reviewed. No pertinent surgical history.   Family History   Problem Relation Age of Onset    Thyroid Disorder Mother     No Known Problems Father     No Known Problems Paternal Grandmother     No Known Problems Paternal Grandfather     No Known Problems Son     No Known Problems Son       Social History:   Social History     Socioeconomic History    Marital status:    Tobacco Use    Smoking status: Never    Smokeless tobacco: Never   Vaping Use    Vaping status: Never Used   Substance and Sexual Activity    Alcohol use: Yes     Alcohol/week: 2.0 standard drinks of alcohol     Types: 2 Glasses of wine per week    Drug use: No   Other Topics Concern    Pt has a pacemaker No    Pt has a defibrillator No    Reaction to local anesthetic No          REVIEW OF SYSTEMS:   GENERAL: feels well otherwise  SKIN: denies any unusual skin lesions  EYES:denies blurred vision or double vision  HEENT: denies nasal congestion, sinus pain, ST, sore throat  LUNGS: denies shortness of breath with exertion, cough or wheezing  BREAST: denies masses or nipple discharge  CARDIOVASCULAR: denies chest pain, pressure, or palpitations  GI: denies abdominal pain, nausea, vomiting, diarrhea, constipation, hematochezia, or melena  : denies dysuria, hematuria  NEURO: denies headaches, dizziness, focal deficits  EXT: denies edema    EXAM:   /74   Pulse 83   Temp 98.2 °F (36.8 °C)   Ht 5' 2\" (1.575 m)   Wt 160 lb (72.6 kg)   SpO2 97%   BMI 29.26 kg/m²     GENERAL: well developed, well nourished, in no apparent distress  HEENT: normal oropharynx, normal TM's b/l  EYES: PERRLA, EOMI, conjunctivae are pink  NECK: supple, no cervical or supraclavicular LAD, no carotic bruits, no thyromegaly  BREAST: no palpable masses nipple retraction, skin dimpling, axillary LAD b/l  LUNGS: clear to auscultation b/l,  no w/r/r  CARDIO: RRR, normal S1S2,  no m/r/g  GI: soft, NT, ND, NABS, no HSM  EXTREMITIES: no c/c/e +2 DP pulses bilaterally  SKIN: + medial malleolus ulcer L ankle now scabbed over w/o surrounding erythema  NEURO: A&O x 3, moves all 4 extremities spontaneously      ASSESSMENT AND PLAN:   Marleny Lorenz is a 54 year old female who presents to establish care.    Encounter to establish care  - advised patient to obtain the following labs fasting:   - Vitamin B12 [E]; Future  - Microalb/Creat Ratio, Random Urine [E]; Future  - TSH W Reflex To Free T4 [E]; Future  - Lipid Panel [E]; Future  - Urinalysis with Culture Reflex; Future  - Hepatic Function Panel (7) [E]; Future    Uncontrolled type 2 diabetes mellitus with hyperglycemia, without long-term current use of insulin (HCC)  - Most recent hemoglobin A1c: 11.7% 10/11/21  - Current regimen:   - metformin 1000 mg bid   - rx jardiance 10 mg daily   - rx ozempic 0.25 mg weekly, up-titration per pharmD  - Complications:   - CV: no, rx atorvastatin 10 mg at bedtime   - Nephropathy: microalb/cr ratio 10/11/21 wnl, repeat ordered   - Neuropathy: denies    - Retinopathy: denies, sees Dr. Marysol Fuller yearly  - Foot exam: per podiatry Dr. Rao  - refuses endocrinology referral at this time  - Diabetic Test Strips and Supplies  - Diabetic Education - [facility]  - Vitamin B12 [E]; Future  - Microalb/Creat Ratio, Random Urine [E]; Future    Encounter for screening mammogram for malignant neoplasm of breast  - [facility] ZULEYMA 2D+3D SCREENING BILAT (CPT=77067/52065); Future    Chronic knee pain, unspecified laterality  - XR KNEE (3 VIEWS), LEFT (CPT=73562); Future  - XR KNEE (3 VIEWS), RIGHT (CPT=73562); Future    Screening for malignant neoplasm of cervix  - OBG Referral - In Network    Screening for malignant neoplasm of colon  - Gastro Referral - In Network    HLD  - lipids as above  - rx atorvastatin 10 mg at bedtime to start pending results of lab testing, side effects  discussed    Hx of PCOS    Healthcare Maintenance  Cancer Screenings:  - Breast: mammogram ordered  - Cervical: due, gyn referral placed, cannot remember date of last pap, denies hx of abnormal pap smears  - Colon: GI referral placed    Vaccines:  - Tdap: 10/11/21  - Shingles: shingrix recommended  - Pneumonia: prevnar 20 recommended, ordered today, penumovax 23 10/11/21  - Flu: recommend yearly  - COVID-19: x2    Misc:  - DEXA: n/a    RTC in pending results of above testing.    Labs ordered as above. Informed patient to expect messaging only if the labs are abnormal via patient preferred method of communication (phone calls).    For E/M code - 60 minutes spent reviewing performing chart review, obtaining a history, performing a physical exam, reviewing the assessment/plan, placing orders, and completing documentation.     Juliana Vergara DO  6/14/2024  12:00 PM

## 2024-06-18 ENCOUNTER — TELEPHONE (OUTPATIENT)
Dept: INTERNAL MEDICINE CLINIC | Facility: CLINIC | Age: 54
End: 2024-06-18

## 2024-06-26 ENCOUNTER — HOSPITAL ENCOUNTER (OUTPATIENT)
Dept: GENERAL RADIOLOGY | Facility: HOSPITAL | Age: 54
Discharge: HOME | End: 2024-06-26
Attending: INTERNAL MEDICINE

## 2024-06-26 ENCOUNTER — LAB ENCOUNTER (OUTPATIENT)
Dept: LAB | Facility: HOSPITAL | Age: 54
End: 2024-06-26
Attending: INTERNAL MEDICINE

## 2024-06-26 ENCOUNTER — TELEPHONE (OUTPATIENT)
Dept: INTERNAL MEDICINE CLINIC | Facility: CLINIC | Age: 54
End: 2024-06-26

## 2024-06-26 DIAGNOSIS — M25.569 CHRONIC KNEE PAIN, UNSPECIFIED LATERALITY: ICD-10-CM

## 2024-06-26 DIAGNOSIS — G89.29 CHRONIC KNEE PAIN, UNSPECIFIED LATERALITY: ICD-10-CM

## 2024-06-26 DIAGNOSIS — E11.65 UNCONTROLLED TYPE 2 DIABETES MELLITUS WITH HYPERGLYCEMIA, WITHOUT LONG-TERM CURRENT USE OF INSULIN (HCC): ICD-10-CM

## 2024-06-26 LAB
ALBUMIN SERPL-MCNC: 4.6 G/DL (ref 3.2–4.8)
ALP LIVER SERPL-CCNC: 78 U/L
ALT SERPL-CCNC: <7 U/L
AST SERPL-CCNC: 16 U/L (ref ?–34)
BILIRUB DIRECT SERPL-MCNC: 0.1 MG/DL (ref ?–0.3)
BILIRUB SERPL-MCNC: 0.4 MG/DL (ref 0.3–1.2)
BILIRUB UR QL: NEGATIVE
CHOLEST SERPL-MCNC: 218 MG/DL (ref ?–200)
CLARITY UR: CLEAR
CREAT UR-SCNC: 114 MG/DL
FASTING PATIENT LIPID ANSWER: YES
GLUCOSE UR-MCNC: >1000 MG/DL
HDLC SERPL-MCNC: 53 MG/DL (ref 40–59)
HGB UR QL STRIP.AUTO: NEGATIVE
KETONES UR-MCNC: 10 MG/DL
LDLC SERPL CALC-MCNC: 138 MG/DL (ref ?–100)
LEUKOCYTE ESTERASE UR QL STRIP.AUTO: NEGATIVE
MICROALBUMIN UR-MCNC: 0.6 MG/DL
MICROALBUMIN/CREAT 24H UR-RTO: 5.3 UG/MG (ref ?–30)
NITRITE UR QL STRIP.AUTO: NEGATIVE
NONHDLC SERPL-MCNC: 165 MG/DL (ref ?–130)
PH UR: 5.5 [PH] (ref 5–8)
PROT SERPL-MCNC: 7.8 G/DL (ref 5.7–8.2)
PROT UR-MCNC: NEGATIVE MG/DL
SP GR UR STRIP: >1.03 (ref 1–1.03)
T4 FREE SERPL-MCNC: 1 NG/DL (ref 0.8–1.7)
TRIGL SERPL-MCNC: 152 MG/DL (ref 30–149)
TSI SER-ACNC: 7.13 MIU/ML (ref 0.55–4.78)
UROBILINOGEN UR STRIP-ACNC: NORMAL
VIT B12 SERPL-MCNC: 646 PG/ML (ref 211–911)
VLDLC SERPL CALC-MCNC: 28 MG/DL (ref 0–30)

## 2024-06-26 PROCEDURE — 82570 ASSAY OF URINE CREATININE: CPT

## 2024-06-26 PROCEDURE — 84439 ASSAY OF FREE THYROXINE: CPT

## 2024-06-26 PROCEDURE — 80061 LIPID PANEL: CPT

## 2024-06-26 PROCEDURE — 84443 ASSAY THYROID STIM HORMONE: CPT

## 2024-06-26 PROCEDURE — 82607 VITAMIN B-12: CPT

## 2024-06-26 PROCEDURE — 81003 URINALYSIS AUTO W/O SCOPE: CPT

## 2024-06-26 PROCEDURE — 36415 COLL VENOUS BLD VENIPUNCTURE: CPT

## 2024-06-26 PROCEDURE — 82043 UR ALBUMIN QUANTITATIVE: CPT

## 2024-06-26 PROCEDURE — 73562 X-RAY EXAM OF KNEE 3: CPT | Performed by: INTERNAL MEDICINE

## 2024-06-26 PROCEDURE — 80076 HEPATIC FUNCTION PANEL: CPT

## 2024-06-26 NOTE — TELEPHONE ENCOUNTER
To Dr Rose ( Pt of Dr Escobar),    Please note today PAQ    TSH is elevated at 7.130 ( 0550.4-4.780)

## 2024-07-01 ENCOUNTER — TELEPHONE (OUTPATIENT)
Dept: INTERNAL MEDICINE CLINIC | Facility: CLINIC | Age: 54
End: 2024-07-01

## 2024-07-01 DIAGNOSIS — R79.89 ELEVATED TSH: Primary | ICD-10-CM

## 2024-07-02 NOTE — TELEPHONE ENCOUNTER
Left detailed message with patient, relaying MD message below. (OK per [facility] Verbal Release / Privacy)    
Patient returned nurses call, she complete understanding to Lary's message   
To nursing staff, please relay the following to Marleny Lorenz:    X-ray knees showed no acute findings, only mild narrowing of medial joint spaces.    Thyroid stimulating hormone was elevated but overall thyroid hormone level was normal. Recommend repeating this non-fasting lab and avoid supplements (biotin) for at least 72 hrs prior to lab draw. Order placed.    Cholesterol was elevated, continue atorvastatin and plan to repeat fasting lipids and CMP in 3 months.    Thank you!  
no

## 2024-07-16 ENCOUNTER — OFFICE VISIT (OUTPATIENT)
Dept: INTERNAL MEDICINE CLINIC | Facility: CLINIC | Age: 54
End: 2024-07-16

## 2024-07-16 VITALS
HEART RATE: 77 BPM | OXYGEN SATURATION: 97 % | DIASTOLIC BLOOD PRESSURE: 82 MMHG | HEIGHT: 62 IN | SYSTOLIC BLOOD PRESSURE: 122 MMHG | WEIGHT: 164 LBS | BODY MASS INDEX: 30.18 KG/M2

## 2024-07-16 DIAGNOSIS — E11.65 UNCONTROLLED TYPE 2 DIABETES MELLITUS WITH HYPERGLYCEMIA, WITHOUT LONG-TERM CURRENT USE OF INSULIN (HCC): Primary | ICD-10-CM

## 2024-07-16 PROCEDURE — 99214 OFFICE O/P EST MOD 30 MIN: CPT | Performed by: INTERNAL MEDICINE

## 2024-07-16 NOTE — PROGRESS NOTES
Marleny Lorenz is a 54 year old female.No chief complaint on file.    HPI:   Marleny Lorenz is a 54 year old female who presents for a check up.      Labs 6/26 reviewed.    Blood sugars are down 130-140s preprandial from >200s before. Taking jardiance-metformin 12.5-1000 mg daily (combination pill from [place]). Has ~4 months left.    Wt Readings from Last 6 Encounters:   07/16/24 164 lb (74.4 kg)   06/14/24 160 lb (72.6 kg)   05/20/24 163 lb (73.9 kg)   10/11/21 163 lb (73.9 kg)   02/02/18 172 lb (78 kg)     Body mass index is 30 kg/m².     Current Outpatient Medications   Medication Sig Dispense Refill    atorvastatin 10 MG Oral Tab Take 1 tablet (10 mg total) by mouth nightly. 90 tablet 3    NON FORMULARY Take 4 capsules by mouth daily. 4 Life - glucose      COLLAGEN OR Take 2 capsules by mouth daily.      empagliflozin 10 MG Oral Tab Take 1 tablet (10 mg total) by mouth daily. 90 tablet 3      Past Medical History:    Diabetes (HCC)    Hyperlipidemia    PCOS (polycystic ovarian syndrome)      History reviewed. No pertinent surgical history.   Family History   Problem Relation Age of Onset    Thyroid Disorder Mother     No Known Problems Father     No Known Problems Paternal Grandmother     No Known Problems Paternal Grandfather     No Known Problems Son     No Known Problems Son       Social History:   Social History     Socioeconomic History    Marital status:    Tobacco Use    Smoking status: Never    Smokeless tobacco: Never   Vaping Use    Vaping status: Never Used   Substance and Sexual Activity    Alcohol use: Yes     Alcohol/week: 2.0 standard drinks of alcohol     Types: 2 Glasses of wine per week    Drug use: No   Other Topics Concern    Pt has a pacemaker No    Pt has a defibrillator No    Reaction to local anesthetic No          REVIEW OF SYSTEMS:   GENERAL: feels well otherwise    EXAM:   /82   Pulse 77   Ht 5' 2\" (1.575 m)   Wt 164 lb (74.4 kg)   SpO2 97%   BMI 30.00  kg/m²     GENERAL: well developed, well nourished, in no apparent distress  NEURO: A&O x 3, moves all 4 extremities spontaneously    ASSESSMENT AND PLAN:   Marleny Lorenz is a 54 year old female who presents for a complete physical exam.     Uncontrolled type 2 diabetes mellitus with hyperglycemia, without long-term current use of insulin ([facility])  - Most recent hemoglobin A1c: 11.7% 10/11/21  - Preprandial sugars 130-140s, prevously >200s  - Current regimen:               - [place] empagliflozin/metformin 12.5-1000 mg daily               - rx jardiance 12.5 mg daily               - rx ozempic 0.25 mg weekly, up-titration per pharmD  - Complications:               - CV: no, rx atorvastatin 10 mg at bedtime               - Nephropathy: microalb/cr ratio wnl 6/24/24               - Neuropathy: denies                - Retinopathy: denies, sees Dr. Marysol Fuller yearly  - Foot exam: per podiatry Dr. Rao  - refuses endocrinology referral at this time  - COMP METABOLIC PANEL [88587] [Q]  - TSH W Reflex To Free T4  - HGB A1C [496] [Q]    HLD  -  6/26/24, rx atorvastatin 10 mg at bedtime   - repeat lipids in 2-3 months  - LIPID PANEL [7600] [Q]     Hx of PCOS     Healthcare Maintenance  Cancer Screenings:  - Breast: mammogram ordered  - Cervical: due, gyn referral placed, cannot remember date of last pap, denies hx of abnormal pap smears  - Colon: GI referral placed     Vaccines:  - Tdap: 10/11/21  - Shingles: shingrix recommended  - Pneumonia: prevnar 20 recommended, ordered today, penumovax 23 10/11/21  - Flu: recommend yearly  - COVID-19: x2     Misc:  - DEXA: n/a     RTC in 2 months with labs prior or sooner PRN.     Labs ordered as above. Informed patient to expect messaging only if the labs are abnormal via patient preferred method of communication (phone calls).    For E/M code - 30 minutes spent reviewing performing chart review, obtaining a history, performing a physical exam, reviewing the  assessment/plan, placing orders, and completing documentation.     Juliana Vergara DO  7/16/2024  10:14 AM

## 2024-07-22 NOTE — TELEPHONE ENCOUNTER
Called patient and relayed  message - verbalized understanding - transferred to [place] to discuss medication issue  
Hi! Patient said can afford the jardiance but cannot afford the ozempic however has ~4 months left of her pills from [facility] and blood sugars are much more controlled per her report so no changes or new rx just yet. She will see me in early September for repeat A1c and we'll decide on a plan.    Thank you!  
Juan R Blackwell  
Left message to call back.     
Left message to call back.    Per [place], suggest Slyvia or Rosalva can discuss using a [place] pharmacy.   
Patient is calling to let the doctor know that the Jardiance  and the Ozempic with the insurance is over  $1,100 a month.  Patient states that is just to much money.  Patient is asking if there is something else she can take.    Patient also stated that she is going to continue for 1 more month taking the Jardiance Duo (combination of  Jardiance and Metformin).  [place]. Patient states this medication is working well for her.    Please call and advise  
Pt. Returned the nurse call.  
Pt. Returned the nurse call.  
Recommend patient obtain fasting labs as previously ordered and schedule phone visit after to discuss.    Thank you!  
Spoke to pt - she will get a copy of formulary first;  then we will explore [place] [facility]  
To  - pt has OV on Tues and will likely ask you about the following ( [employer] messages about jardiance and ozempic )  cost prohibitive for pt so I had her check with insurance but they only cover $1000/yr period for meds  [place] prices for jardiance are about $115 / 3 month supply;   the compounded semaglutide would be $200 / month;   I can do the [place] pharmacy with her when I get back from vacation  
To  to help advise   
mychart  
noted  
[employer]

## (undated) NOTE — LETTER
February 6, 2018    [address] [place] [id number]      Dear Lena Carreon:    The following are the results of your recent tests. Please review the list of test results.   Your result is the value on the left; we have also supplied the rang MPV 8.6 7.4 - 10.3 fL   Neutrophil % 51 %   Lymphocyte % 41 %   Monocyte % 6 %   Eosinophil % 2 %   Basophil % 1 %   Neutrophil Absolute 2.8 1.8 - 7.7 K/UL   Lymphocyte Absolute 2.2 1.0 - 4.0 K/UL   Monocyte Absolute 0.3 0.0 - 1.0 K/UL   Eosinophil Absolut

## (undated) NOTE — LETTER
11/11/21        [address]  [address] [facility] [id number]      Dear Myrna Room records indicate that you have outstanding lab work and or testing that was ordered for you and has not yet been completed:  Orders Placed This Encounter

## (undated) NOTE — LETTER
01/18/22        [address]  [address] [facility] [address]      Dear Aki Patterson records indicate that you have outstanding lab work and or testing that was ordered for you and has not yet been completed:  Orders Placed This Encounter      A